# Patient Record
Sex: FEMALE | Employment: STUDENT | URBAN - METROPOLITAN AREA
[De-identification: names, ages, dates, MRNs, and addresses within clinical notes are randomized per-mention and may not be internally consistent; named-entity substitution may affect disease eponyms.]

---
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
## 2024-05-21 ENCOUNTER — OFFICE VISIT (OUTPATIENT)
Dept: URGENT CARE | Facility: URGENT CARE | Age: 19
End: 2024-05-21
Payer: COMMERCIAL

## 2024-05-21 VITALS
DIASTOLIC BLOOD PRESSURE: 83 MMHG | WEIGHT: 226 LBS | OXYGEN SATURATION: 99 % | TEMPERATURE: 98.6 F | RESPIRATION RATE: 18 BRPM | SYSTOLIC BLOOD PRESSURE: 122 MMHG | HEART RATE: 63 BPM

## 2024-05-21 DIAGNOSIS — R05.1 ACUTE COUGH: ICD-10-CM

## 2024-05-21 DIAGNOSIS — J22 LOWER RESPIRATORY INFECTION: Primary | ICD-10-CM

## 2024-05-21 DIAGNOSIS — R43.0 LOSS OF SMELL: ICD-10-CM

## 2024-05-21 DIAGNOSIS — R43.2 LOSS OF TASTE: ICD-10-CM

## 2024-05-21 PROCEDURE — 99203 OFFICE O/P NEW LOW 30 MIN: CPT | Performed by: PHYSICIAN ASSISTANT

## 2024-05-21 PROCEDURE — 87635 SARS-COV-2 COVID-19 AMP PRB: CPT | Performed by: PHYSICIAN ASSISTANT

## 2024-05-21 RX ORDER — ALBUTEROL SULFATE 90 UG/1
2 AEROSOL, METERED RESPIRATORY (INHALATION) EVERY 4 HOURS PRN
Qty: 18 G | Refills: 0 | Status: SHIPPED | OUTPATIENT
Start: 2024-05-21

## 2024-05-21 NOTE — PATIENT INSTRUCTIONS
Zyrtec once daily  Albuterol 2-4 puffs every 4-6 hours as needed    No indication for antibiotics discussed.   Rest! Your body needs more rest to heal.  Drink plenty of fluids (warm fluids like tea or soup are soothing and reduce cough)  Sit in the bathroom with a hot shower running and breathe in the steam.  Honey may soothe your sore throat and help manage your cough- may take straight or in warm water with lemon juice.  Avoid smoke (cigarettes, bonfires, fireplace, wood burning stoves).  Take Tylenol or an NSAID such as ibuprofen or naproxen as needed for pain.  Delsym (dextromethorphan polistirex) is an over the counter cough medication that lasts 12 hours.   Mucinex or Robitussin (guiafenesin) thin mucus and may help it to loosen more quickly  Good handwashing is the best way to prevent spread of germs  Present to emergency room if you develop trouble breathing, swallowing or cough-up blood.  Follow up with your primary care provider if symptoms worsen or fail to improve as expected.

## 2024-05-21 NOTE — PROGRESS NOTES
Assessment & Plan     Lower respiratory infection  - albuterol (PROAIR HFA/PROVENTIL HFA/VENTOLIN HFA) 108 (90 Base) MCG/ACT inhaler; Inhale 2 puffs into the lungs every 4 hours as needed for shortness of breath or wheezing    Acute cough  - Symptomatic COVID-19 Virus (Coronavirus) by PCR Nose    Loss of smell  - Symptomatic COVID-19 Virus (Coronavirus) by PCR Nose    Loss of taste  - Symptomatic COVID-19 Virus (Coronavirus) by PCR Nose    Physical exam is grossly normal, minimal wheezing in bilateral lung bases.  Vital stable with a temperature of 98.6, 18 respirations per minute and oxygen saturation of 99%.  We discussed using albuterol inhaler every 4 hours as needed.  Advised her to quit vaping.  Follow-up to be seen if symptoms significantly worsen or fail to improve.  COVID test in process.  Stay home from work until COVID test result returns.    Return in about 1 week (around 5/28/2024) for visit with primary care provider if not improving.     Lindsay Mitchell PA-C  [facility]    Subjective   Jania Chelsea Middleton is a 19 year old who presents for the following health issues     Patient presents with:  URI: SOB, fatigue, runny nose, cough, sneezing, loss of taste/smell- covid test this morning was negative. Sx started about 2 days ago. Took dayquil      HPI    Jania presents clinic today for evaluation of URI symptoms.  Symptoms first began about 3-4 days ago.  She had nasal congestion and sneezing cough and chest tightness.  She began taking Zyrtec but notes that symptoms continue to worsen.  In the last couple days, she has noticed difficulty breathing and some wheezing, especially when she lies down at night. She lost her sense of smell and taste, home COVID test was negative. No fevers.  She has been taking DayQuil cold and flu.  She vapes.    Review of Systems   ROS negative except as stated above.      Objective    /83   Pulse 63   Temp 98.6  F (37  C) (Tympanic)    Resp 18   Wt 102.5 kg (226 lb)   SpO2 99%   Physical Exam   GENERAL: alert and no distress  EYES: Eyes grossly normal to inspection, PERRL and conjunctivae and sclerae normal  HENT: ear canals and TM's normal, nose and mouth without ulcers or lesions  NECK: no adenopathy, no asymmetry, masses, or scars  RESP: lungs with minimal expiratory wheezing in bilateral lung bases, intermittent, to auscultation - no rales, rhonchi.  No increased respiratory effort  CV: regular rate and rhythm, normal S1 S2, no S3 or S4, no murmur, click or rub, no peripheral edema    No results found for any visits on 05/21/24.

## 2024-05-21 NOTE — LETTER
[facility] [place]  [address] [place] [place] [address]  Phone: [phone]    May 21, 2024        Jania Middleton  [id number] [place] [address]          To whom it may concern:    RE: Jania Middleton    Patient was seen and treated today at our clinic. Before returning to work, she must be fever free for 24 hours and have an improvement in symptoms. Please excuse her from work missed during this time. If she must be absent beyond 5/26/24, she'll need to be seen for further evaluation.     Please contact me for questions or concerns.      Sincerely,        Lindsay Mitchell

## 2024-05-23 LAB — SARS-COV-2 RNA RESP QL NAA+PROBE: NEGATIVE

## 2024-07-28 ENCOUNTER — HEALTH MAINTENANCE LETTER (OUTPATIENT)
Age: 19
End: 2024-07-28

## 2024-10-05 ENCOUNTER — OFFICE VISIT (OUTPATIENT)
Dept: URGENT CARE | Facility: URGENT CARE | Age: 19
End: 2024-10-05
Payer: COMMERCIAL

## 2024-10-05 VITALS
DIASTOLIC BLOOD PRESSURE: 79 MMHG | RESPIRATION RATE: 20 BRPM | WEIGHT: 230 LBS | OXYGEN SATURATION: 99 % | HEART RATE: 87 BPM | SYSTOLIC BLOOD PRESSURE: 120 MMHG | TEMPERATURE: 99.2 F

## 2024-10-05 DIAGNOSIS — J06.9 VIRAL URI WITH COUGH: Primary | ICD-10-CM

## 2024-10-05 DIAGNOSIS — R07.0 THROAT PAIN: ICD-10-CM

## 2024-10-05 LAB
DEPRECATED S PYO AG THROAT QL EIA: NEGATIVE
GROUP A STREP BY PCR: NOT DETECTED

## 2024-10-05 PROCEDURE — 87635 SARS-COV-2 COVID-19 AMP PRB: CPT | Performed by: NURSE PRACTITIONER

## 2024-10-05 PROCEDURE — 87651 STREP A DNA AMP PROBE: CPT | Performed by: NURSE PRACTITIONER

## 2024-10-05 PROCEDURE — 99213 OFFICE O/P EST LOW 20 MIN: CPT | Performed by: NURSE PRACTITIONER

## 2024-10-05 NOTE — LETTER
October 5, 2024      Jania Middleton  [id number] [place] [address]        To Whom It May Concern:    Nigelenzo ChelseaRanda Middleton  was seen on 10/5/24.  Please excuse her  until symptoms improving for 24-hours due to illness.        Sincerely,        SELAM Morris

## 2024-10-05 NOTE — PROGRESS NOTES
Assessment & Plan     Viral URI with cough      Throat pain    - Streptococcus A Rapid Screen w/Reflex to PCR - Clinic Collect  - Group A Streptococcus PCR Throat Swab  - Symptomatic COVID-19 Virus (Coronavirus) by PCR Nose     Reviewed negative rapid strep results during visit, PCR testing in process and COVID test in process, will notify if positive. Discussed symptoms likely viral in nature and antibiotic not indicated at this time. Recommended rest, fluids, Dayquil and ibuprofen and tylenol as needed, gargle warm salt water, throat lozenges. Mono testing not indicated currently.     Follow-up with PCP if symptoms persist for 7 days, and sooner if symptoms worsen or new symptoms develop.     Discussed red flag symptoms which warrant immediate visit in emergency room    All questions were answered and patient verbalized understanding. AVS reviewed with patient.     Alexandra Okeefe, DNP, APRN, CNP 10/5/2024 12:51 PM  [facility]    Chery Dykes is a 19 year old female who presents to clinic today with her mom for the following health issues:  Chief Complaint   Patient presents with    Pharyngitis     Sore throat since yesterday  Coughing since Wednesday  Fatigue     Patient presents for evaluation of sore throat. Associated symptoms: cough, fatigue, white spots in throat, post-nasal drip.   Sore throat started yesterday and cough started 3 days ago and has been worsening. No known exposures though went to a [place] game the day before. Works as a PCA. Denies fever, nasal congestion, ear pain. Yesterday took ibuprofen and Dayquil which helped quite a bit, nothing today.      Problem list, Medication list, Allergies, and Medical history reviewed in EPIC.    ROS:  Review of systems negative except for noted above        Objective    /79   Pulse 87   Temp 99.2  F (37.3  C) (Oral)   Resp 20   Wt 104.3 kg (230 lb)   SpO2 99%   Physical Exam  Constitutional:       General: She  is not in acute distress.     Appearance: She is not toxic-appearing or diaphoretic.   HENT:      Head: Normocephalic and atraumatic.      Right Ear: Tympanic membrane, ear canal and external ear normal.      Left Ear: Tympanic membrane, ear canal and external ear normal.      Nose:      Comments: Mild nasal congestion     Mouth/Throat:      Mouth: Mucous membranes are moist.      Pharynx: Oropharynx is clear. Posterior oropharyngeal erythema present. No oropharyngeal exudate.      Tonsils: No tonsillar exudate or tonsillar abscesses. 1+ on the right. 1+ on the left.      Comments: Moderate oropharyngeal erythema  Cardiovascular:      Rate and Rhythm: Normal rate and regular rhythm.      Heart sounds: Normal heart sounds.   Pulmonary:      Effort: Pulmonary effort is normal. No respiratory distress.      Breath sounds: Normal breath sounds. No wheezing, rhonchi or rales.   Lymphadenopathy:      Cervical: No cervical adenopathy.   Skin:     General: Skin is warm and dry.   Neurological:      Mental Status: She is alert.          Labs:  Results for orders placed or performed in visit on 10/05/24   Streptococcus A Rapid Screen w/Reflex to PCR - Clinic Collect     Status: Normal    Specimen: Throat; Swab   Result Value Ref Range    Group A Strep antigen Negative Negative

## 2024-10-06 LAB — SARS-COV-2 RNA RESP QL NAA+PROBE: NEGATIVE

## 2024-10-07 ENCOUNTER — TELEPHONE (OUTPATIENT)
Dept: URGENT CARE | Facility: URGENT CARE | Age: 19
End: 2024-10-07
Payer: COMMERCIAL

## 2024-10-07 ENCOUNTER — OFFICE VISIT (OUTPATIENT)
Dept: URGENT CARE | Facility: URGENT CARE | Age: 19
End: 2024-10-07
Payer: COMMERCIAL

## 2024-10-07 VITALS
RESPIRATION RATE: 15 BRPM | HEART RATE: 52 BPM | DIASTOLIC BLOOD PRESSURE: 74 MMHG | OXYGEN SATURATION: 99 % | SYSTOLIC BLOOD PRESSURE: 125 MMHG | TEMPERATURE: 98.6 F

## 2024-10-07 DIAGNOSIS — J03.90 TONSILLITIS: Primary | ICD-10-CM

## 2024-10-07 LAB
BASOPHILS # BLD AUTO: 0.1 10E3/UL (ref 0–0.2)
BASOPHILS NFR BLD AUTO: 1 %
EOSINOPHIL # BLD AUTO: 0.2 10E3/UL (ref 0–0.7)
EOSINOPHIL NFR BLD AUTO: 2 %
ERYTHROCYTE [DISTWIDTH] IN BLOOD BY AUTOMATED COUNT: 12.1 % (ref 10–15)
HCT VFR BLD AUTO: 41.5 % (ref 35–47)
HGB BLD-MCNC: 13.8 G/DL (ref 11.7–15.7)
IMM GRANULOCYTES # BLD: 0 10E3/UL
IMM GRANULOCYTES NFR BLD: 0 %
LYMPHOCYTES # BLD AUTO: 2.1 10E3/UL (ref 0.8–5.3)
LYMPHOCYTES NFR BLD AUTO: 26 %
MCH RBC QN AUTO: 29.6 PG (ref 26.5–33)
MCHC RBC AUTO-ENTMCNC: 33.3 G/DL (ref 31.5–36.5)
MCV RBC AUTO: 89 FL (ref 78–100)
MONOCYTES # BLD AUTO: 0.5 10E3/UL (ref 0–1.3)
MONOCYTES NFR BLD AUTO: 6 %
MONOCYTES NFR BLD AUTO: NEGATIVE %
NEUTROPHILS # BLD AUTO: 5.2 10E3/UL (ref 1.6–8.3)
NEUTROPHILS NFR BLD AUTO: 65 %
PLATELET # BLD AUTO: 323 10E3/UL (ref 150–450)
RBC # BLD AUTO: 4.67 10E6/UL (ref 3.8–5.2)
WBC # BLD AUTO: 8.1 10E3/UL (ref 4–11)

## 2024-10-07 PROCEDURE — 85025 COMPLETE CBC W/AUTO DIFF WBC: CPT | Performed by: FAMILY MEDICINE

## 2024-10-07 PROCEDURE — 99214 OFFICE O/P EST MOD 30 MIN: CPT | Performed by: FAMILY MEDICINE

## 2024-10-07 PROCEDURE — 36415 COLL VENOUS BLD VENIPUNCTURE: CPT | Performed by: FAMILY MEDICINE

## 2024-10-07 PROCEDURE — 86308 HETEROPHILE ANTIBODY SCREEN: CPT | Performed by: FAMILY MEDICINE

## 2024-10-07 RX ORDER — AMOXICILLIN 875 MG/1
875 TABLET, COATED ORAL 2 TIMES DAILY
Qty: 14 TABLET | Refills: 0 | Status: SHIPPED | OUTPATIENT
Start: 2024-10-07 | End: 2024-10-14

## 2024-10-07 NOTE — TELEPHONE ENCOUNTER
"Patient is calling with pharyngitis.    \"I have white spots on my tonsils.  It is hard to eat and swallow.  I can't eat.\"    Writer advised patient to go back to  today.    To establish care with PCP.    Patient agreed with a plan.    Bessie Perera RN, BSN  [facility]      "

## 2024-10-07 NOTE — PATIENT INSTRUCTIONS
If the test is negative for mononucleosis lets do the antibiotic amoxicillin twice a day for the next 7 days      Do salt water gargles several times a day to help remove debris from your tonsils      If at any point you develop abnormal drooling, high-pitched noise from your throat or your lungs (stridor), increasing difficulty with swallowing or inability to open your mouth --please seek medical attention right away    Return in 3 days if no improvement

## 2024-10-07 NOTE — PROGRESS NOTES
Assessment & Plan     Tonsillitis  - CBC with platelets and differential  - Mononucleosis screen  - amoxicillin (AMOXIL) 875 MG tablet  Dispense: 14 tablet; Refill: 0  - CBC with platelets and differential  - Mononucleosis screen     No evidence of peritonsillar abscess.  Presentation does not suggest epiglottitis.  Lung exam was unremarkable.  We came to shared decision to proceed with mono testing today as there were discussion of this at the previous clinic visit and they have concerns for such, although no known exposures.  Blood work unremarkable therefore we will do a trial of amoxicillin twice daily for the next week she will return in the next few days if symptoms have not improved.  Salt water gargles were also recommended.      Braulio Silva MD   [facility]    Chery Dykes is a 19 year old female who presents to clinic today for the following health issues:  Chief Complaint   Patient presents with    URI     Sore throat, cough, nausea, fatigue, white in mouth is spreading, feels like airway is closing   Taking dayquil and ibuprofen      HPI    Patient is accompanied by her mother today for follow-up of ongoing sore throat and discomfort with swallowing.  No vomiting or diarrhea.  No abdominal pain.  Remedies as noted above.  They would like to have mono screening done today.    There has been no tripoding or drooling or change to voice    NO new fevers since last clinic visit a few days ago where she was negative for COVID/strep    No hx of anaphylaxis      There are no problems to display for this patient.      Current Outpatient Medications   Medication Sig Dispense Refill    amoxicillin (AMOXIL) 875 MG tablet Take 1 tablet (875 mg) by mouth 2 times daily for 7 days. 14 tablet 0    albuterol (PROAIR HFA/PROVENTIL HFA/VENTOLIN HFA) 108 (90 Base) MCG/ACT inhaler Inhale 2 puffs into the lungs every 4 hours as needed for shortness of breath or wheezing (Patient not taking: Reported  on 10/5/2024) 18 g 0     No current facility-administered medications for this visit.           Objective    /74   Pulse 52   Temp 98.6  F (37  C) (Oral)   Resp 15   SpO2 99%   Physical Exam         Lungs: absent of stridor, no tripoding or drooling  Throat: 3+ tonsils with small tonsil stones, no trismus, uvula midline  GEn: NAD, no muffled voice  Neck: no enlarged nodes    Results for orders placed or performed in visit on 10/07/24   Mononucleosis screen     Status: Normal   Result Value Ref Range    Mononucleosis Screen Negative Negative   CBC with platelets and differential     Status: None   Result Value Ref Range    WBC Count 8.1 4.0 - 11.0 10e3/uL    RBC Count 4.67 3.80 - 5.20 10e6/uL    Hemoglobin 13.8 11.7 - 15.7 g/dL    Hematocrit 41.5 35.0 - 47.0 %    MCV 89 78 - 100 fL    MCH 29.6 26.5 - 33.0 pg    MCHC 33.3 31.5 - 36.5 g/dL    RDW 12.1 10.0 - 15.0 %    Platelet Count 323 150 - 450 10e3/uL    % Neutrophils 65 %    % Lymphocytes 26 %    % Monocytes 6 %    % Eosinophils 2 %    % Basophils 1 %    % Immature Granulocytes 0 %    Absolute Neutrophils 5.2 1.6 - 8.3 10e3/uL    Absolute Lymphocytes 2.1 0.8 - 5.3 10e3/uL    Absolute Monocytes 0.5 0.0 - 1.3 10e3/uL    Absolute Eosinophils 0.2 0.0 - 0.7 10e3/uL    Absolute Basophils 0.1 0.0 - 0.2 10e3/uL    Absolute Immature Granulocytes 0.0 <=0.4 10e3/uL   CBC with platelets and differential     Status: None    Narrative    The following orders were created for panel order CBC with platelets and differential.  Procedure                               Abnormality         Status                     ---------                               -----------         ------                     CBC with platelets and d...[[id number]]                      Final result                 Please view results for these tests on the individual orders.                     The use of Dragon/Formative Labsation services may have been used to construct the content in this note; any  grammatical or spelling errors are non-intentional. Please contact the author of this note directly if you are in need of any clarification.

## 2025-08-10 ENCOUNTER — HEALTH MAINTENANCE LETTER (OUTPATIENT)
Age: 20
End: 2025-08-10